# Patient Record
Sex: MALE | Race: WHITE | NOT HISPANIC OR LATINO | Employment: OTHER | URBAN - NONMETROPOLITAN AREA
[De-identification: names, ages, dates, MRNs, and addresses within clinical notes are randomized per-mention and may not be internally consistent; named-entity substitution may affect disease eponyms.]

---

## 2020-09-15 NOTE — PROGRESS NOTES
Patient: Garett Alvarado    YOB: 1950    Date: 09/17/2020    Primary Care Provider: Jose Raul Field MD    Chief Complaint   Patient presents with   • Colonoscopy     positive cologuard       SUBJECTIVE:    History of present illness:  I saw the patient in the office today as a consultation for evaluation and treatment of a positive Cologard test. Patient also complains of diarrhea and blood present when wiping.  No significant rectal pain.  No anemia or weight loss.  No significant abdominal pain.  Last colonoscopy 6 years ago.    The following portions of the patient's history were reviewed and updated as appropriate: allergies, current medications, past family history, past medical history, past social history, past surgical history and problem list.    Review of Systems   Constitutional: Negative for chills, fever and unexpected weight change.   HENT: Negative for trouble swallowing and voice change.    Eyes: Negative for visual disturbance.   Respiratory: Negative for apnea, cough, chest tightness, shortness of breath and wheezing.    Cardiovascular: Negative for chest pain, palpitations and leg swelling.   Gastrointestinal: Positive for blood in stool and diarrhea. Negative for abdominal distention, abdominal pain, anal bleeding, constipation, nausea, rectal pain and vomiting.   Endocrine: Negative for cold intolerance and heat intolerance.   Genitourinary: Negative for difficulty urinating, dysuria, flank pain, scrotal swelling and testicular pain.   Musculoskeletal: Negative for back pain, gait problem and joint swelling.   Skin: Negative for color change, rash and wound.   Neurological: Negative for dizziness, syncope, speech difficulty, weakness, numbness and headaches.   Hematological: Negative for adenopathy. Does not bruise/bleed easily.   Psychiatric/Behavioral: Negative for confusion. The patient is not nervous/anxious.        History:  Past Medical History:   Diagnosis Date   • Rectal  "bleeding        Past Surgical History:   Procedure Laterality Date   • EYE SURGERY     • GLAUCOMA SURGERY         No family history on file.    Social History     Tobacco Use   • Smoking status: Current Every Day Smoker     Packs/day: 2.00     Types: Cigars   • Smokeless tobacco: Never Used   Substance Use Topics   • Alcohol use: Not Currently   • Drug use: Never       Medications:   Current Outpatient Medications:   •  bisacodyl (bisacodyl) 5 MG EC tablet, Take 2 at 3pm and 2 at 7pm the day prior to colonoscopy., Disp: 4 tablet, Rfl: 0  •  dorzolamide-timolol (COSOPT) 22.3-6.8 MG/ML ophthalmic solution, , Disp: , Rfl:   •  polyethylene glycol (MIRALAX) 17 GM/SCOOP powder, Mix 238g powder with 64 oz of clear liquid. Starting at 5pm drink 80z every 10-15 minutes until consumed., Disp: 238 g, Rfl: 0       Allergies: No Known Allergies    OBJECTIVE:    Vital Signs:  Vitals:    09/17/20 0921   BP: 146/93   Pulse: 70   Temp: 98 °F (36.7 °C)   SpO2: 99%   Weight: 109 kg (240 lb)   Height: 175.3 cm (69\")       Physical Exam:   General Appearance:    Alert, cooperative, in no acute distress   Head:    Normocephalic, without obvious abnormality, atraumatic   Eyes:            Lids and lashes normal, conjunctivae and sclerae normal, no   icterus, no pallor, corneas clear, PERRL   Ears:    Ears appear intact with no abnormalities noted   Throat:   No oral lesions, no thrush, oral mucosa moist   Neck:   No adenopathy, supple, trachea midline, no thyromegaly,  no JVD   Lungs:     Clear to auscultation,respirations regular, even and                  unlabored    Heart:    Regular rhythm and normal rate, normal S1 and S2, no            murmur   Abdomen:     no masses, no organomegaly, soft non-tender, non-distended, no guarding.  Minimal epigastric pain left lower quadrant pain.  Some tenderness with no rebound or guarding.  No abdominal wall hernias.   Extremities:   Moves all extremities well, no edema, no cyanosis, no            "  redness   Pulses:   Pulses palpable and equal bilaterally   Skin:   No bleeding, bruising or rash   Lymph nodes:   No palpable adenopathy   Neurologic:   Cranial nerves 2 - 12 grossly intact, sensation intact  Psychiatric: No evidence of depression or anxiety   Results Review:   I reviewed the patient's new clinical results.    Review of Systems was reviewed and confirmed as accurate as documented by the MA.    ASSESSMENT/PLAN:    1. Preop testing    2. Heme positive stool        I recommend a colonoscopy for further evaluation. The procedure was explained as well as the risks which include but are not limited to bleeding, infection, perforation, abdominal pain etc. The patient understands these risks and the procedure and wishes to proceed.  Continue high-fiber diet and Metamucil daily.     Electronically signed by Belinda Hoover MD  09/17/20  14:11 EDT

## 2020-09-17 ENCOUNTER — OFFICE VISIT (OUTPATIENT)
Dept: SURGERY | Facility: CLINIC | Age: 70
End: 2020-09-17

## 2020-09-17 VITALS
HEART RATE: 70 BPM | OXYGEN SATURATION: 99 % | TEMPERATURE: 98 F | BODY MASS INDEX: 35.55 KG/M2 | DIASTOLIC BLOOD PRESSURE: 93 MMHG | HEIGHT: 69 IN | SYSTOLIC BLOOD PRESSURE: 146 MMHG | WEIGHT: 240 LBS

## 2020-09-17 DIAGNOSIS — R19.5 HEME POSITIVE STOOL: ICD-10-CM

## 2020-09-17 DIAGNOSIS — Z01.818 PREOP TESTING: Primary | ICD-10-CM

## 2020-09-17 PROCEDURE — 99203 OFFICE O/P NEW LOW 30 MIN: CPT | Performed by: SURGERY

## 2020-09-17 RX ORDER — BISACODYL 5 MG
TABLET, DELAYED RELEASE (ENTERIC COATED) ORAL
Qty: 4 TABLET | Refills: 0 | Status: SHIPPED | OUTPATIENT
Start: 2020-09-17

## 2020-09-17 RX ORDER — DORZOLAMIDE HYDROCHLORIDE AND TIMOLOL MALEATE 20; 5 MG/ML; MG/ML
SOLUTION/ DROPS OPHTHALMIC
COMMUNITY
Start: 2020-08-19

## 2020-09-17 RX ORDER — POLYETHYLENE GLYCOL 3350 17 G/17G
POWDER, FOR SOLUTION ORAL
Qty: 238 G | Refills: 0 | Status: SHIPPED | OUTPATIENT
Start: 2020-09-17

## 2020-09-29 ENCOUNTER — HOSPITAL ENCOUNTER (OUTPATIENT)
Facility: HOSPITAL | Age: 70
Discharge: HOME | End: 2020-09-29
Payer: MEDICARE

## 2020-09-29 LAB — SARS-COV-2, NAAT: NOT DETECTED

## 2020-09-29 PROCEDURE — U0002 COVID-19 LAB TEST NON-CDC: HCPCS

## 2020-10-01 ENCOUNTER — ANESTHESIA EVENT (OUTPATIENT)
Dept: ENDOSCOPY | Facility: HOSPITAL | Age: 70
End: 2020-10-01
Payer: MEDICARE

## 2020-10-01 ENCOUNTER — ANESTHESIA (OUTPATIENT)
Dept: ENDOSCOPY | Facility: HOSPITAL | Age: 70
End: 2020-10-01
Payer: MEDICARE

## 2020-10-01 ENCOUNTER — HOSPITAL ENCOUNTER (OUTPATIENT)
Facility: HOSPITAL | Age: 70
Setting detail: OUTPATIENT SURGERY
Discharge: HOME | End: 2020-10-01
Attending: SURGERY | Admitting: SURGERY
Payer: MEDICARE

## 2020-10-01 ENCOUNTER — OUTSIDE FACILITY SERVICE (OUTPATIENT)
Dept: SURGERY | Facility: CLINIC | Age: 70
End: 2020-10-01

## 2020-10-01 VITALS
RESPIRATION RATE: 18 BRPM | OXYGEN SATURATION: 98 % | SYSTOLIC BLOOD PRESSURE: 150 MMHG | DIASTOLIC BLOOD PRESSURE: 102 MMHG

## 2020-10-01 VITALS
WEIGHT: 235 LBS | HEART RATE: 59 BPM | HEIGHT: 70 IN | BODY MASS INDEX: 33.64 KG/M2 | DIASTOLIC BLOOD PRESSURE: 90 MMHG | SYSTOLIC BLOOD PRESSURE: 156 MMHG | OXYGEN SATURATION: 92 % | TEMPERATURE: 97.8 F | RESPIRATION RATE: 20 BRPM

## 2020-10-01 PROCEDURE — 3700000000 HC ANESTHESIA ATTENDED CARE: Performed by: SURGERY

## 2020-10-01 PROCEDURE — 88342 IMHCHEM/IMCYTCHM 1ST ANTB: CPT

## 2020-10-01 PROCEDURE — 7100000010 HC PHASE II RECOVERY - FIRST 15 MIN: Performed by: SURGERY

## 2020-10-01 PROCEDURE — 7100000011 HC PHASE II RECOVERY - ADDTL 15 MIN: Performed by: SURGERY

## 2020-10-01 PROCEDURE — 2709999900 HC NON-CHARGEABLE SUPPLY: Performed by: SURGERY

## 2020-10-01 PROCEDURE — 2580000003 HC RX 258: Performed by: SURGERY

## 2020-10-01 PROCEDURE — 6360000002 HC RX W HCPCS: Performed by: NURSE ANESTHETIST, CERTIFIED REGISTERED

## 2020-10-01 PROCEDURE — 88341 IMHCHEM/IMCYTCHM EA ADD ANTB: CPT

## 2020-10-01 PROCEDURE — 45385 COLONOSCOPY W/LESION REMOVAL: CPT | Performed by: SURGERY

## 2020-10-01 PROCEDURE — 2500000003 HC RX 250 WO HCPCS: Performed by: NURSE ANESTHETIST, CERTIFIED REGISTERED

## 2020-10-01 PROCEDURE — 45380 COLONOSCOPY AND BIOPSY: CPT | Performed by: SURGERY

## 2020-10-01 PROCEDURE — 3609010200 HC COLONOSCOPY ABLATION TUMOR POLYP/OTHER LES: Performed by: SURGERY

## 2020-10-01 PROCEDURE — 88305 TISSUE EXAM BY PATHOLOGIST: CPT

## 2020-10-01 PROCEDURE — 3700000001 HC ADD 15 MINUTES (ANESTHESIA): Performed by: SURGERY

## 2020-10-01 RX ORDER — SODIUM CHLORIDE, SODIUM LACTATE, POTASSIUM CHLORIDE, CALCIUM CHLORIDE 600; 310; 30; 20 MG/100ML; MG/100ML; MG/100ML; MG/100ML
INJECTION, SOLUTION INTRAVENOUS CONTINUOUS
Status: DISCONTINUED | OUTPATIENT
Start: 2020-10-01 | End: 2020-10-01 | Stop reason: HOSPADM

## 2020-10-01 RX ORDER — PROPOFOL 10 MG/ML
INJECTION, EMULSION INTRAVENOUS PRN
Status: DISCONTINUED | OUTPATIENT
Start: 2020-10-01 | End: 2020-10-01 | Stop reason: SDUPTHER

## 2020-10-01 RX ADMIN — PROPOFOL 40 MG: 10 INJECTION, EMULSION INTRAVENOUS at 10:56

## 2020-10-01 RX ADMIN — PROPOFOL 30 MG: 10 INJECTION, EMULSION INTRAVENOUS at 11:11

## 2020-10-01 RX ADMIN — PROPOFOL 50 MG: 10 INJECTION, EMULSION INTRAVENOUS at 10:48

## 2020-10-01 RX ADMIN — PROPOFOL 30 MG: 10 INJECTION, EMULSION INTRAVENOUS at 11:00

## 2020-10-01 RX ADMIN — PROPOFOL 50 MG: 10 INJECTION, EMULSION INTRAVENOUS at 10:41

## 2020-10-01 RX ADMIN — LIDOCAINE HYDROCHLORIDE 20 MG: 10 INJECTION, SOLUTION INFILTRATION; PERINEURAL at 10:37

## 2020-10-01 RX ADMIN — PROPOFOL 40 MG: 10 INJECTION, EMULSION INTRAVENOUS at 10:51

## 2020-10-01 RX ADMIN — PROPOFOL 20 MG: 10 INJECTION, EMULSION INTRAVENOUS at 11:07

## 2020-10-01 RX ADMIN — SODIUM CHLORIDE, POTASSIUM CHLORIDE, SODIUM LACTATE AND CALCIUM CHLORIDE: 600; 310; 30; 20 INJECTION, SOLUTION INTRAVENOUS at 09:35

## 2020-10-01 RX ADMIN — PROPOFOL 40 MG: 10 INJECTION, EMULSION INTRAVENOUS at 10:44

## 2020-10-01 RX ADMIN — PROPOFOL 40 MG: 10 INJECTION, EMULSION INTRAVENOUS at 11:04

## 2020-10-01 RX ADMIN — PROPOFOL 60 MG: 10 INJECTION, EMULSION INTRAVENOUS at 10:37

## 2020-10-01 ASSESSMENT — LIFESTYLE VARIABLES: SMOKING_STATUS: 1

## 2020-10-01 NOTE — PROGRESS NOTES
Pt arrives ambulatory. No complaints noted. Verifies he is here for colonoscopy with Dr. Nandini Wilson. States prep was effective. Verbalizes understanding of procedure. Consent on chart. States daughter will drive him home post-procedure. HR regular. Lungs clear. +BS. Denies chest pain or SOA. Side rails up x 2.

## 2020-10-01 NOTE — ANESTHESIA PRE PROCEDURE
Department of Anesthesiology  Preprocedure Note       Name:  Edgar Elizabeth   Age:  68 y.o.  :  1950                                          MRN:  [id number]         Date:  10/1/2020      Surgeon: Codi Vasquez):  Carey Warren MD    Procedure: Procedure(s):  COLONOSCOPY DIAGNOSTIC    Medications prior to admission:   Prior to Admission medications    Not on File       Current medications:    Current Facility-Administered Medications   Medication Dose Route Frequency Provider Last Rate Last Dose    lactated ringers infusion   Intravenous Continuous Carey Warren MD 80 mL/hr at 10/01/20 0935         Allergies: Allergies   Allergen Reactions    Iv Dye [Iodides]        Problem List:  There is no problem list on file for this patient. Past Medical History:  No past medical history on file. Past Surgical History:  No past surgical history on file. Social History:    Social History     Tobacco Use    Smoking status: Not on file   Substance Use Topics    Alcohol use: Not on file                                Counseling given: Not Answered      Vital Signs (Current):   Vitals:    10/01/20 0927   BP: (!) 156/103   Pulse: 79   Resp: 20   Temp: 97.3 °F (36.3 °C)   TempSrc: Oral   SpO2: 95%   Weight: 235 lb (106.6 kg)   Height: 5' 9.5\" (1.765 m)                                              BP Readings from Last 3 Encounters:   10/01/20 (!) 156/103       NPO Status: Time of last liquid consumption: 1800                        Time of last solid consumption: 1400                        Date of last liquid consumption: 20                        Date of last solid food consumption: 20    BMI:   Wt Readings from Last 3 Encounters:   10/01/20 235 lb (106.6 kg)     Body mass index is 34.21 kg/m².     CBC: No results found for: WBC, RBC, HGB, HCT, MCV, RDW, PLT    CMP: No results found for: NA, K, CL, CO2, BUN, CREATININE, GFRAA, AGRATIO, LABGLOM, GLUCOSE, PROT, CALCIUM, BILITOT, ALKPHOS, AST, ALT    POC Tests: No results for input(s): POCGLU, POCNA, POCK, POCCL, POCBUN, POCHEMO, POCHCT in the last 72 hours. Coags: No results found for: PROTIME, INR, APTT    HCG (If Applicable): No results found for: PREGTESTUR, PREGSERUM, HCG, HCGQUANT     ABGs: No results found for: PHART, PO2ART, SZS4END, LNQ0BZS, BEART, D9JGREVN     Type & Screen (If Applicable):  No results found for: LABABO, LABRH    Drug/Infectious Status (If Applicable):  No results found for: HIV, HEPCAB    COVID-19 Screening (If Applicable):   Lab Results   Component Value Date    COVID19 Not Detected 09/29/2020         Anesthesia Evaluation  Patient summary reviewed and Nursing notes reviewed  Airway: Mallampati: II        Dental:          Pulmonary:normal exam  breath sounds clear to auscultation  (+) current smoker                           Cardiovascular:Negative CV ROS            Rhythm: regular  Rate: normal                    Neuro/Psych:   Negative Neuro/Psych ROS              GI/Hepatic/Renal: Neg GI/Hepatic/Renal ROS            Endo/Other:              Pt had PAT visit. ROS comment: BMI 34 Abdominal:           Vascular: negative vascular ROS. Anesthesia Plan      MAC     ASA 2       Induction: intravenous. Anesthetic plan and risks discussed with patient. Use of blood products discussed with patient whom.                    SINAN Diaz - CRNA   10/1/2020

## 2020-10-01 NOTE — PROGRESS NOTES
Pt pauline PO well without N/V.  IV DC'd with tip intact and pressure held. DC instructions with F/U given without questions, also reviewed with pt daughter. Condition stable. Belongings with pt. Pt taken to POV via [facility], daughter to drive pt home.

## 2020-10-01 NOTE — ANESTHESIA POSTPROCEDURE EVALUATION
Department of Anesthesiology  Postprocedure Note    Patient: Pascual Perez  MRN: [id number]  YOB: 1950  Date of evaluation: 10/1/2020  Time:  12:04 PM     Procedure Summary     Date:  10/01/20 Room / Location:  [address] [place] [address] / [facility]    Anesthesia Start:  4613 Anesthesia Stop:  6099    Procedure:  COLONOSCOPY DIAGNOSTIC (N/A ) Diagnosis:       (T82.102)      (R19.5)    Surgeon:  Kishore Morales MD Responsible Provider:  SINAN Coughlin CRNA    Anesthesia Type:  MAC ASA Status:  2          Anesthesia Type: MAC    Kaylah Phase I: Kaylah Score: 10    Kaylah Phase II:      Last vitals: Reviewed and per EMR flowsheets.        Anesthesia Post Evaluation    Patient location during evaluation: bedside  Patient participation: complete - patient participated  Level of consciousness: awake and alert  Airway patency: patent  Nausea & Vomiting: no nausea and no vomiting  Complications: no  Cardiovascular status: blood pressure returned to baseline  Respiratory status: acceptable  Hydration status: stable

## 2020-10-06 NOTE — PROGRESS NOTES
Patient: Garett Alvarado    YOB: 1950    Date: 10/08/2020    Primary Care Provider: Jose Raul Field MD    Reason for Consultation: Follow-up colonoscopy    Chief Complaint   Patient presents with   • Follow-up     colon       History of present illness:  I saw the patient in the office today as a followup from their recent colonoscopy with polypectomy, the pathology report did show invasive moderately differentiated adenocarcinoma arising in a tubular adenoma.  They state that they have done well and are having no complaints.  Patient's margins were clear by 1.5 mm on initial polypectomy site and further resection was also negative for progressive cancer.  Patient high risk for surgical intervention.    The following portions of the patient's history were reviewed and updated as appropriate: allergies, current medications, past family history, past medical history, past social history, past surgical history and problem list.    Review of Systems   Constitutional: Negative for chills, fever and unexpected weight change.   HENT: Negative for trouble swallowing and voice change.    Eyes: Negative for visual disturbance.   Respiratory: Negative for apnea, cough, chest tightness, shortness of breath and wheezing.    Cardiovascular: Negative for chest pain, palpitations and leg swelling.   Gastrointestinal: Negative for abdominal distention, abdominal pain, anal bleeding, blood in stool, constipation, diarrhea, nausea, rectal pain and vomiting.   Endocrine: Negative for cold intolerance and heat intolerance.   Genitourinary: Negative for difficulty urinating, dysuria, flank pain, scrotal swelling and testicular pain.   Musculoskeletal: Negative for back pain, gait problem and joint swelling.   Skin: Negative for color change, rash and wound.   Neurological: Negative for dizziness, syncope, speech difficulty, weakness, numbness and headaches.   Hematological: Negative for adenopathy. Does not bruise/bleed  "easily.   Psychiatric/Behavioral: Negative for confusion. The patient is not nervous/anxious.        Allergies:  No Known Allergies    Medications:    Current Outpatient Medications:   •  bisacodyl (bisacodyl) 5 MG EC tablet, Take 2 at 3pm and 2 at 7pm the day prior to colonoscopy., Disp: 4 tablet, Rfl: 0  •  dorzolamide-timolol (COSOPT) 22.3-6.8 MG/ML ophthalmic solution, , Disp: , Rfl:   •  polyethylene glycol (MIRALAX) 17 GM/SCOOP powder, Mix 238g powder with 64 oz of clear liquid. Starting at 5pm drink 80z every 10-15 minutes until consumed., Disp: 238 g, Rfl: 0    Vital Signs:  Vitals:    10/08/20 1015   BP: 144/74   Pulse: 81   Temp: 98.2 °F (36.8 °C)   TempSrc: Temporal   SpO2: 98%   Weight: 109 kg (240 lb 4.8 oz)   Height: 175.3 cm (69.02\")       Physical Exam:   General Appearance:    Alert, cooperative, in no acute distress   Abdomen:     no masses, no organomegaly, soft and nontender.  No abdominal wall masses.   Chest:      Clear to ausculation            Cor:     Regular rate and rhythm    Results Review:   I reviewed the patient's new clinical results.    Assessment / Plan:    1. Rectal cancer (CMS/HCC)    2. Adenomatous polyp of ascending colon        I did discuss the situation with the patient today in the office and they have done well from their recent colonoscopy with polypectomy.  I have released the patient back to normal activity.  I need to see the patient back in the office in 6 months and they will need to have repeat colonoscopy at that time.  Patient told that he has high risk for recurrence of the cancer.  It is resected with negative margins at this time and he would like to opt for repeat colonoscopy in 6 months instead resection.    Electronically signed by Belinda Hoover MD  10/08/20                  "

## 2020-10-08 ENCOUNTER — OFFICE VISIT (OUTPATIENT)
Dept: SURGERY | Facility: CLINIC | Age: 70
End: 2020-10-08

## 2020-10-08 VITALS
TEMPERATURE: 98.2 F | HEART RATE: 81 BPM | HEIGHT: 69 IN | DIASTOLIC BLOOD PRESSURE: 74 MMHG | BODY MASS INDEX: 35.59 KG/M2 | OXYGEN SATURATION: 98 % | SYSTOLIC BLOOD PRESSURE: 144 MMHG | WEIGHT: 240.3 LBS

## 2020-10-08 DIAGNOSIS — D12.2 ADENOMATOUS POLYP OF ASCENDING COLON: ICD-10-CM

## 2020-10-08 DIAGNOSIS — C20 RECTAL CANCER (HCC): Primary | ICD-10-CM

## 2020-10-08 PROCEDURE — 99212 OFFICE O/P EST SF 10 MIN: CPT | Performed by: SURGERY

## 2021-04-05 NOTE — PROGRESS NOTES
Patient: Garett Alvarado  YOB: 1950    Date: 04/08/2021    Primary Care Provider: Jose Raul Field MD    Chief Complaint   Patient presents with   • Follow-up     colon cancer       History: I saw patient is in the office today for 6 month follow up colonoscopy with polypectomy, the pathology report did show invasive moderately differentiated adenocarcinoma arising in a tubular adenoma.  They state that they have done well and are having no complaints.    Patient denies rectal bleeding, abdominal pain and constipation improved.    The following portions of the patient's history were reviewed and updated as appropriate: allergies, current medications, past family history, past medical history, past social history, past surgical history and problem list.    Review of Systems   Constitutional: Negative for chills, fever and unexpected weight change.   HENT: Negative for trouble swallowing and voice change.    Eyes: Negative for visual disturbance.   Respiratory: Negative for apnea, cough, chest tightness, shortness of breath and wheezing.    Cardiovascular: Negative for chest pain, palpitations and leg swelling.   Gastrointestinal: Negative for abdominal distention, abdominal pain, anal bleeding, blood in stool, constipation, diarrhea, nausea, rectal pain and vomiting.   Endocrine: Negative for cold intolerance and heat intolerance.   Genitourinary: Negative for difficulty urinating, dysuria, flank pain, scrotal swelling and testicular pain.   Musculoskeletal: Negative for back pain, gait problem and joint swelling.   Skin: Negative for color change, rash and wound.   Neurological: Negative for dizziness, syncope, speech difficulty, weakness, numbness and headaches.   Hematological: Negative for adenopathy. Does not bruise/bleed easily.   Psychiatric/Behavioral: Negative for confusion. The patient is not nervous/anxious.        Vital Signs  Vitals:    04/08/21 0936   BP: 153/82   Pulse: 111   Temp: 98.9  "°F (37.2 °C)   SpO2: 95%   Weight: 111 kg (244 lb 3.2 oz)   Height: 175.3 cm (69.02\")       Allergies:  No Known Allergies    Medications:    Current Outpatient Medications:   •  bisacodyl (bisacodyl) 5 MG EC tablet, Take 2 at 3pm and 2 at 7pm the day prior to colonoscopy., Disp: 4 tablet, Rfl: 0  •  brimonidine (ALPHAGAN) 0.2 % ophthalmic solution, , Disp: , Rfl:   •  dorzolamide-timolol (COSOPT) 22.3-6.8 MG/ML ophthalmic solution, , Disp: , Rfl:   •  polyethylene glycol (MIRALAX) 17 GM/SCOOP powder, Mix 238g powder with 64 oz of clear liquid. Starting at 5pm drink 80z every 10-15 minutes until consumed., Disp: 238 g, Rfl: 0    Physical Exam:   General Appearance:    Alert, cooperative, in no acute distress   Head:    Normocephalic, without obvious abnormality, atraumatic   Lungs:     Clear to auscultation,respirations regular, even and                  unlabored    Heart:    Regular rhythm and normal rate, normal S1 and S2, no            murmur, no gallop, no rub, no click   Abdomen:     Normal bowel sounds, no masses, no organomegaly, soft        non-tender, non-distended, no guarding, no rebound                Tenderness.  No abdominal wall masses or hernias.  No significant tenderness.   Extremities:   Moves all extremities well, no edema, no cyanosis, no             redness   Pulses:   Pulses palpable and equal bilaterally   Skin:   No bleeding, bruising or rash     Results Review:   I reviewed the patient's new clinical results.     Review of Systems was reviewed and confirmed as accurate as documented by the MA.    ASSESSMENT/PLAN:    1. History of colon polyps    2. Chronic idiopathic constipation       Patient scheduled for colonoscopy to evaluate the adenocarcinoma that was found in a polyp.  Risk of bleeding perforation discussed and patient agreeable.  Continue high-fiber diet and Metamucil for chronic constipation.    Electronically signed by Belinda Hoover MD  04/08/21      Portions of this note " have been scribed for Belinda Hoover MD by Michelle Milligan. 4/8/2021  10:41 EDT

## 2021-04-08 ENCOUNTER — OFFICE VISIT (OUTPATIENT)
Dept: SURGERY | Facility: CLINIC | Age: 71
End: 2021-04-08

## 2021-04-08 ENCOUNTER — TELEPHONE (OUTPATIENT)
Dept: SURGERY | Facility: CLINIC | Age: 71
End: 2021-04-08

## 2021-04-08 VITALS
SYSTOLIC BLOOD PRESSURE: 153 MMHG | WEIGHT: 244.2 LBS | HEART RATE: 111 BPM | BODY MASS INDEX: 36.17 KG/M2 | DIASTOLIC BLOOD PRESSURE: 82 MMHG | HEIGHT: 69 IN | TEMPERATURE: 98.9 F | OXYGEN SATURATION: 95 %

## 2021-04-08 DIAGNOSIS — K59.04 CHRONIC IDIOPATHIC CONSTIPATION: ICD-10-CM

## 2021-04-08 DIAGNOSIS — Z01.818 PREOP TESTING: Primary | ICD-10-CM

## 2021-04-08 DIAGNOSIS — Z86.010 HISTORY OF COLON POLYPS: Primary | ICD-10-CM

## 2021-04-08 PROCEDURE — 99213 OFFICE O/P EST LOW 20 MIN: CPT | Performed by: SURGERY

## 2021-04-08 RX ORDER — POLYETHYLENE GLYCOL 3350 17 G/17G
POWDER, FOR SOLUTION ORAL
Qty: 238 G | Refills: 0 | Status: SHIPPED | OUTPATIENT
Start: 2021-04-08

## 2021-04-08 RX ORDER — BRIMONIDINE TARTRATE 2 MG/ML
SOLUTION/ DROPS OPHTHALMIC
COMMUNITY
Start: 2021-01-28

## 2021-04-08 RX ORDER — BISACODYL 5 MG
TABLET, DELAYED RELEASE (ENTERIC COATED) ORAL
Qty: 4 TABLET | Refills: 0 | Status: SHIPPED | OUTPATIENT
Start: 2021-04-08

## 2021-04-19 ENCOUNTER — HOSPITAL ENCOUNTER (OUTPATIENT)
Facility: HOSPITAL | Age: 71
Discharge: HOME | End: 2021-04-19
Payer: MEDICARE

## 2021-04-19 LAB — SARS-COV-2, NAAT: NOT DETECTED

## 2021-04-19 PROCEDURE — 87635 SARS-COV-2 COVID-19 AMP PRB: CPT

## 2021-04-22 ENCOUNTER — ANESTHESIA EVENT (OUTPATIENT)
Dept: ENDOSCOPY | Facility: HOSPITAL | Age: 71
End: 2021-04-22
Payer: MEDICARE

## 2021-04-22 ENCOUNTER — ANESTHESIA (OUTPATIENT)
Dept: ENDOSCOPY | Facility: HOSPITAL | Age: 71
End: 2021-04-22
Payer: MEDICARE

## 2021-04-22 ENCOUNTER — HOSPITAL ENCOUNTER (OUTPATIENT)
Facility: HOSPITAL | Age: 71
Setting detail: OUTPATIENT SURGERY
Discharge: HOME | End: 2021-04-22
Attending: SURGERY | Admitting: SURGERY
Payer: MEDICARE

## 2021-04-22 ENCOUNTER — OUTSIDE FACILITY SERVICE (OUTPATIENT)
Dept: SURGERY | Facility: CLINIC | Age: 71
End: 2021-04-22

## 2021-04-22 VITALS
HEART RATE: 65 BPM | BODY MASS INDEX: 33.64 KG/M2 | WEIGHT: 235 LBS | RESPIRATION RATE: 18 BRPM | OXYGEN SATURATION: 96 % | SYSTOLIC BLOOD PRESSURE: 148 MMHG | DIASTOLIC BLOOD PRESSURE: 94 MMHG | TEMPERATURE: 98.2 F | HEIGHT: 70 IN

## 2021-04-22 VITALS
DIASTOLIC BLOOD PRESSURE: 107 MMHG | RESPIRATION RATE: 14 BRPM | SYSTOLIC BLOOD PRESSURE: 156 MMHG | OXYGEN SATURATION: 96 %

## 2021-04-22 PROCEDURE — 2580000003 HC RX 258: Performed by: SURGERY

## 2021-04-22 PROCEDURE — 7100000010 HC PHASE II RECOVERY - FIRST 15 MIN: Performed by: SURGERY

## 2021-04-22 PROCEDURE — 88305 TISSUE EXAM BY PATHOLOGIST: CPT

## 2021-04-22 PROCEDURE — 3609010400 HC COLONOSCOPY POLYPECTOMY HOT BIOPSY: Performed by: SURGERY

## 2021-04-22 PROCEDURE — 2500000003 HC RX 250 WO HCPCS: Performed by: NURSE ANESTHETIST, CERTIFIED REGISTERED

## 2021-04-22 PROCEDURE — 2709999900 HC NON-CHARGEABLE SUPPLY: Performed by: SURGERY

## 2021-04-22 PROCEDURE — 3700000001 HC ADD 15 MINUTES (ANESTHESIA): Performed by: SURGERY

## 2021-04-22 PROCEDURE — 45380 COLONOSCOPY AND BIOPSY: CPT | Performed by: SURGERY

## 2021-04-22 PROCEDURE — 7100000011 HC PHASE II RECOVERY - ADDTL 15 MIN: Performed by: SURGERY

## 2021-04-22 PROCEDURE — 6360000002 HC RX W HCPCS: Performed by: NURSE ANESTHETIST, CERTIFIED REGISTERED

## 2021-04-22 PROCEDURE — 45384 COLONOSCOPY W/LESION REMOVAL: CPT | Performed by: SURGERY

## 2021-04-22 PROCEDURE — 3700000000 HC ANESTHESIA ATTENDED CARE: Performed by: SURGERY

## 2021-04-22 PROCEDURE — 3609010300 HC COLONOSCOPY W/BIOPSY SINGLE/MULTIPLE

## 2021-04-22 RX ORDER — SODIUM CHLORIDE, SODIUM LACTATE, POTASSIUM CHLORIDE, CALCIUM CHLORIDE 600; 310; 30; 20 MG/100ML; MG/100ML; MG/100ML; MG/100ML
INJECTION, SOLUTION INTRAVENOUS CONTINUOUS
Status: DISCONTINUED | OUTPATIENT
Start: 2021-04-22 | End: 2021-04-22 | Stop reason: HOSPADM

## 2021-04-22 RX ORDER — PROPOFOL 10 MG/ML
INJECTION, EMULSION INTRAVENOUS PRN
Status: DISCONTINUED | OUTPATIENT
Start: 2021-04-22 | End: 2021-04-22 | Stop reason: SDUPTHER

## 2021-04-22 RX ADMIN — LIDOCAINE HYDROCHLORIDE 20 MG: 10 INJECTION, SOLUTION INFILTRATION; PERINEURAL at 09:35

## 2021-04-22 RX ADMIN — PROPOFOL 60 MG: 10 INJECTION, EMULSION INTRAVENOUS at 09:35

## 2021-04-22 RX ADMIN — PROPOFOL 40 MG: 10 INJECTION, EMULSION INTRAVENOUS at 09:43

## 2021-04-22 RX ADMIN — PROPOFOL 50 MG: 10 INJECTION, EMULSION INTRAVENOUS at 09:39

## 2021-04-22 RX ADMIN — SODIUM CHLORIDE, POTASSIUM CHLORIDE, SODIUM LACTATE AND CALCIUM CHLORIDE: 600; 310; 30; 20 INJECTION, SOLUTION INTRAVENOUS at 08:37

## 2021-04-22 NOTE — PROGRESS NOTES
Pt arrives ambulatory. No complaints noted. Verifies he is here for colonoscopy with Dr. Courtney Garcia. States prep was effective. Verbalizes understanding of procedure. Pt cannot see to sign consent. Gives verbal consent for a colonoscopy with Dr. Courtney Garcia. Pt gives permission for daughter to sign consent form. HR regular. Lungs clear. +BS. Denies chest pain or SOA. Per pt, his ability to see depends on amount of light outside/type of day. States daughter will drive him home post-procedure. Side rails up x 2.

## 2021-04-22 NOTE — H&P
HISTORY & PHYSICAL      Patient Name: Rosalinda Pickering [facility] [address] Record Number: [id number]       Date of Service: 4/22/2021      I have reviewed the consult or history and physical from 4/15/2021. There have been no significant changes in the patient's history or exam.  There have been no changes in the patient's medications. Past Surgical History:   Procedure Laterality Date    COLONOSCOPY N/A 10/1/2020    COLONOSCOPY W/ ENDOSCOPIC MUCOSAL RESECTION performed by Olivia Reece MD at [facility]        No past medical history on file.      Current Facility-Administered Medications   Medication Dose Route Frequency Provider Last Rate Last Admin    lactated ringers infusion   Intravenous Continuous Olivia Reece MD 80 mL/hr at 04/22/21 0837 New Bag at 04/22/21 0229        Electronically signed by Olivia Reece MD on 4/22/2021 at 9:58 AM

## 2021-04-22 NOTE — ANESTHESIA POSTPROCEDURE EVALUATION
Department of Anesthesiology  Postprocedure Note    Patient: Сергей Rater  MRN: [id number]  YOB: 1950  Date of evaluation: 4/22/2021  Time:  10:41 AM     Procedure Summary     Date: 04/22/21 Room / Location: [address] [place] [address] 01 / [address] [facility]    Anesthesia Start: 1430 Anesthesia Stop: 1849    Procedure: COLONOSCOPY DIAGNOSTIC (N/A ) Diagnosis:       (Z86.010)      (K59.04)    Surgeons: Lula Ellison MD Responsible Provider: SINAN Camarillo CRNA    Anesthesia Type: MAC ASA Status: 3          Anesthesia Type: MAC    Kaylah Phase I: Kaylah Score: 10    Kaylah Phase II: Kaylah Score: 10    Last vitals: Reviewed and per EMR flowsheets.        Anesthesia Post Evaluation    Patient location during evaluation: bedside  Patient participation: complete - patient participated  Level of consciousness: awake and alert  Airway patency: patent  Nausea & Vomiting: no nausea and no vomiting  Complications: no  Cardiovascular status: blood pressure returned to baseline  Respiratory status: acceptable  Hydration status: stable

## 2021-04-22 NOTE — PROGRESS NOTES
Report received from [facility], [facility]. Pt to room via stretcher, NAD, No c/o noted. HOB elevated 30 degrees, Left side lying position. BS active in all 4 quads. Lung sounds CTA and no distress noted. Rails up x2, stretcher locked. Assessment completed. Awakens easily. Abd soft /NT/ND:+flatus, +BS, Denies N/V/abd pain.

## 2021-04-22 NOTE — PROGRESS NOTES
Pt tolerating PO well without N/V. IV DC'ed with tip intact and pressure applied. No complications at site. DC instructions with follow up given without questions. Condition stable. Belongings with pt. Pt left endoscopy with dgt. Pt ambulatory but unsteady, so transported to car via [facility]. DC instructions in hand.

## 2021-04-22 NOTE — OP NOTE
PATIENT:    Rosalinda Staton    DATE OF SURGERY:  04/22/21    PHYSICIAN:    Nitin Long MD, FACS    REFERRING PHYSICIAN:  Lulú Scott MD      YOB: 1950    PREOPERATIVE DIAGNOSIS:   History of rectal carcinoma with anal polyp    POSTOPERATIVE DIAGNOSIS: Well-healed scar, polyps x2      Estimated blood loss: None    PROCEDURE:  Colonoscopy with polypectomy x2 via hot forcep and multiple biopsies of rectal scar via cold forcep    HISTORY:   The patient was sent to me as a consultation via Lulú Scott MD for evaluation and treatment of the above-mentioned symptomatology. The patient is here now today for elective colonoscopy. I did meet with the patient preoperatively who understands the full risks and benefits of the above-mentioned procedure. We will proceed with this today on an elective basis. ANESTHESIA:  The patient was monitored both preoperatively and postoperatively in the normal fashion from a cardiovascular standpoint. Oxygen was delivered at 2 liters per nasal cannula, and oxygen saturations were monitored during the procedure. The patient remained stable from a cardiovascular standpoint throughout the entire procedure. OPERATIVE PROCEDURE:  The patient was taken to the endoscopy unit and placed in the supine position and given anesthesia as mentioned above. The patient was then placed in the left lateral decubitus position and the scope was introduced into the patients anus and then into the rectum without difficulty. The scope was carefully advanced throughout the colon to the ileocecal valve. All mucosal surfaces were visualized. Upon careful withdrawal of the scope, there was noted to be well-healed scar in the rectum where the tattoo area was present. No recurrent mass or nodule present. I obtained 6 biopsies to rule out microscopic disease. I cannot palpate on rectal examination as well.   Patient also had 2 polyps in the transverse colon and sigmoid colon measured 5 mm each, both removed with hot forcep and retrieved. No other findings in the colon. .      A retroflexed view was obtained of the patients rectum and this showed no hemorrhoids. Digital rectal examination was performed and revealed good sphincter tone and no obvious masses. The patient was stable at this point in time and subsequently transferred back to the recovery room in stable condition. QUALITY OF PREP: Adequate    PLAN: Repeat colonoscopy in 1 year to continue follow-up on rectal carcinoma.     Electronically signed by Andrew Stout MD, FACS on 4/22/2021 at 9:59 AM

## 2021-04-22 NOTE — ANESTHESIA PRE PROCEDURE
Department of Anesthesiology  Preprocedure Note       Name:  Rebeca Baumgarten   Age:  65 y.o.  :  1950                                          MRN:  [id number]         Date:  2021      Surgeon: Julius Baez):  Aiden Ram MD    Procedure: Procedure(s):  COLONOSCOPY DIAGNOSTIC    Medications prior to admission:   Prior to Admission medications    Not on File       Current medications:    Current Facility-Administered Medications   Medication Dose Route Frequency Provider Last Rate Last Admin    lactated ringers infusion   Intravenous Continuous Aiden Ram MD 80 mL/hr at 21 0837 New Bag at 21 0837       Allergies: Allergies   Allergen Reactions    Iv Dye [Iodides]        Problem List:  There is no problem list on file for this patient. Past Medical History:  No past medical history on file. Past Surgical History:        Procedure Laterality Date    COLONOSCOPY N/A 10/1/2020    COLONOSCOPY W/ ENDOSCOPIC MUCOSAL RESECTION performed by Aiden Ram MD at [facility]       Social History:    Social History     Tobacco Use    Smoking status: Not on file   Substance Use Topics    Alcohol use: Not on file                                Counseling given: Not Answered      Vital Signs (Current):   Vitals:    21 0829   BP: (!) 163/112   Pulse: 82   Resp: 24   SpO2: 98%   Weight: 235 lb (106.6 kg)   Height: 5' 9.5\" (1.765 m)                                              BP Readings from Last 3 Encounters:   21 (!) 163/112   10/01/20 (!) 156/90   10/01/20 (!) 150/102       NPO Status: Time of last liquid consumption: 2345                        Time of last solid consumption: 0900                        Date of last liquid consumption: 21                        Date of last solid food consumption: 21    BMI:   Wt Readings from Last 3 Encounters:   21 235 lb (106.6 kg)   10/01/20 235 lb (106.6 kg)     Body mass index is 34.21 kg/m².     CBC: No results found for: WBC, RBC, HGB, HCT, MCV, RDW, PLT    CMP: No results found for: NA, K, CL, CO2, BUN, CREATININE, GFRAA, AGRATIO, LABGLOM, GLUCOSE, PROT, CALCIUM, BILITOT, ALKPHOS, AST, ALT    POC Tests: No results for input(s): POCGLU, POCNA, POCK, POCCL, POCBUN, POCHEMO, POCHCT in the last 72 hours. Coags: No results found for: PROTIME, INR, APTT    HCG (If Applicable): No results found for: PREGTESTUR, PREGSERUM, HCG, HCGQUANT     ABGs: No results found for: PHART, PO2ART, JBJ4TNK, DCL4FAR, BEART, Z4HLITLJ     Type & Screen (If Applicable):  No results found for: LABABO, LABRH    Drug/Infectious Status (If Applicable):  No results found for: HIV, HEPCAB    COVID-19 Screening (If Applicable):   Lab Results   Component Value Date    COVID19 Not Detected 04/19/2021           Anesthesia Evaluation  Patient summary reviewed and Nursing notes reviewed  Airway: Mallampati: II        Dental:          Pulmonary:Negative Pulmonary ROS and normal exam  breath sounds clear to auscultation                             Cardiovascular:Negative CV ROS            Rhythm: regular  Rate: normal                    Neuro/Psych:   Negative Neuro/Psych ROS              GI/Hepatic/Renal: Neg GI/Hepatic/Renal ROS            Endo/Other:              Pt had PAT visit. ROS comment: Sever glaucoma, uses drops  BMI 34 Abdominal:           Vascular: negative vascular ROS. Anesthesia Plan      MAC     ASA 3       Induction: intravenous. Anesthetic plan and risks discussed with patient. Use of blood products discussed with patient whom.                    SINAN Wright CRNA   4/22/2021

## 2022-06-17 ENCOUNTER — TELEPHONE (OUTPATIENT)
Dept: SURGERY | Facility: CLINIC | Age: 72
End: 2022-06-17

## 2022-06-17 NOTE — TELEPHONE ENCOUNTER
CALLED PT IN REGARDS TO 1 YEAR COLONOSCOPY RECALL LAST COLON 04/22/2021. PT IS READY TO S/C.   I HAVE VERIFIED PT DEMOGRAPHICS AND INSURANCE. PLEASE CALL PT TO S/C.

## 2022-06-24 ENCOUNTER — PREP FOR SURGERY (OUTPATIENT)
Dept: OTHER | Facility: HOSPITAL | Age: 72
End: 2022-06-24

## 2022-06-24 DIAGNOSIS — Z12.11 COLON CANCER SCREENING: Primary | ICD-10-CM

## 2022-06-24 RX ORDER — POLYETHYLENE GLYCOL 3350 17 G/17G
POWDER, FOR SOLUTION ORAL
Qty: 238 G | Refills: 0 | Status: SHIPPED | OUTPATIENT
Start: 2022-06-24

## 2022-06-24 RX ORDER — BISACODYL 5 MG
TABLET, DELAYED RELEASE (ENTERIC COATED) ORAL
Qty: 4 TABLET | Refills: 0 | Status: SHIPPED | OUTPATIENT
Start: 2022-06-24

## 2022-06-24 NOTE — TELEPHONE ENCOUNTER
Pt scheduled for colonoscopy at [facility] on 8/11/22.  Isntructions mailed, prep sent in.

## 2022-06-24 NOTE — TELEPHONE ENCOUNTER
PRESCREENING FOR OPEN ACCESS SCHEDULING    Garett Alvarado, 1950  [id number]    06/24/22    If, the patient answers yes to any of the following questions the provider will be informed prior to scheduling open access for approval and documented in the chart.    [x]  Yes  [] No    1. Have you ever had a colonoscopy in the past?      When:        Where:       Polyps or other:     []  Yes  [x] No    2. Family history of colon cancer?      Relation:       Age of onset:       Do you currently have any of the following?    []  Yes  [x] No  Rectal bleeding, if so, how long?     []  Yes  [x] No  Abdominal pain, if so, how long?    []  Yes  [x] No  Constipation, if so, how long?    []  Yes  [x] No  Diarrhea, if so, how long?    []  Yes  [x] No  Weight loss, is so, how much?    [] Yes  [x] No  Small caliber stool, if so, how long?      Have you ever had any of the following conditions?    [] Yes  [x] No  Heart attack?      When?       Last cardiac workup?     Blood thinners?    [] Yes  [x] No   Lung problems, asthma or COPD?  [] Yes  [x] No  Oxygen required?       [] Yes  [x] No  Stroke?     [] Yes  [x] No  Have you ever had a reaction to anesthesia?

## 2022-07-13 ENCOUNTER — TELEPHONE (OUTPATIENT)
Dept: SURGERY | Facility: CLINIC | Age: 72
End: 2022-07-13

## 2022-08-15 ENCOUNTER — TELEPHONE (OUTPATIENT)
Dept: SURGERY | Facility: CLINIC | Age: 72
End: 2022-08-15

## 2022-08-18 ENCOUNTER — OUTSIDE FACILITY SERVICE (OUTPATIENT)
Dept: SURGERY | Facility: CLINIC | Age: 72
End: 2022-08-18

## 2022-08-18 ENCOUNTER — ANESTHESIA (OUTPATIENT)
Dept: ENDOSCOPY | Facility: HOSPITAL | Age: 72
End: 2022-08-18
Payer: MEDICARE

## 2022-08-18 ENCOUNTER — ANESTHESIA EVENT (OUTPATIENT)
Dept: ENDOSCOPY | Facility: HOSPITAL | Age: 72
End: 2022-08-18
Payer: MEDICARE

## 2022-08-18 ENCOUNTER — HOSPITAL ENCOUNTER (OUTPATIENT)
Facility: HOSPITAL | Age: 72
Setting detail: OUTPATIENT SURGERY
Discharge: HOME | End: 2022-08-18
Attending: SURGERY | Admitting: SURGERY
Payer: MEDICARE

## 2022-08-18 VITALS
BODY MASS INDEX: 35.36 KG/M2 | HEIGHT: 70 IN | HEART RATE: 57 BPM | SYSTOLIC BLOOD PRESSURE: 129 MMHG | WEIGHT: 247 LBS | RESPIRATION RATE: 20 BRPM | OXYGEN SATURATION: 100 % | DIASTOLIC BLOOD PRESSURE: 74 MMHG | TEMPERATURE: 97.9 F

## 2022-08-18 PROCEDURE — 3700000000 HC ANESTHESIA ATTENDED CARE: Performed by: SURGERY

## 2022-08-18 PROCEDURE — 6360000002 HC RX W HCPCS: Performed by: NURSE ANESTHETIST, CERTIFIED REGISTERED

## 2022-08-18 PROCEDURE — 2709999900 HC NON-CHARGEABLE SUPPLY: Performed by: SURGERY

## 2022-08-18 PROCEDURE — 7100000011 HC PHASE II RECOVERY - ADDTL 15 MIN: Performed by: SURGERY

## 2022-08-18 PROCEDURE — 45384 COLONOSCOPY W/LESION REMOVAL: CPT | Performed by: SURGERY

## 2022-08-18 PROCEDURE — 7100000010 HC PHASE II RECOVERY - FIRST 15 MIN: Performed by: SURGERY

## 2022-08-18 PROCEDURE — 3700000001 HC ADD 15 MINUTES (ANESTHESIA): Performed by: SURGERY

## 2022-08-18 PROCEDURE — 3609010400 HC COLONOSCOPY POLYPECTOMY HOT BIOPSY: Performed by: SURGERY

## 2022-08-18 PROCEDURE — 2500000003 HC RX 250 WO HCPCS: Performed by: NURSE ANESTHETIST, CERTIFIED REGISTERED

## 2022-08-18 PROCEDURE — 2580000003 HC RX 258: Performed by: SURGERY

## 2022-08-18 RX ORDER — PROPOFOL 10 MG/ML
INJECTION, EMULSION INTRAVENOUS PRN
Status: DISCONTINUED | OUTPATIENT
Start: 2022-08-18 | End: 2022-08-18 | Stop reason: SDUPTHER

## 2022-08-18 RX ORDER — SODIUM CHLORIDE, SODIUM LACTATE, POTASSIUM CHLORIDE, CALCIUM CHLORIDE 600; 310; 30; 20 MG/100ML; MG/100ML; MG/100ML; MG/100ML
INJECTION, SOLUTION INTRAVENOUS CONTINUOUS
Status: DISCONTINUED | OUTPATIENT
Start: 2022-08-18 | End: 2022-08-18 | Stop reason: HOSPADM

## 2022-08-18 RX ADMIN — LIDOCAINE HYDROCHLORIDE 30 MG: 10 INJECTION, SOLUTION INFILTRATION; PERINEURAL at 09:43

## 2022-08-18 RX ADMIN — PROPOFOL 50 MG: 10 INJECTION, EMULSION INTRAVENOUS at 09:43

## 2022-08-18 RX ADMIN — PROPOFOL 40 MG: 10 INJECTION, EMULSION INTRAVENOUS at 09:51

## 2022-08-18 RX ADMIN — PROPOFOL 40 MG: 10 INJECTION, EMULSION INTRAVENOUS at 09:47

## 2022-08-18 RX ADMIN — SODIUM CHLORIDE, POTASSIUM CHLORIDE, SODIUM LACTATE AND CALCIUM CHLORIDE: 600; 310; 30; 20 INJECTION, SOLUTION INTRAVENOUS at 09:02

## 2022-08-18 NOTE — OP NOTE
PATIENT:    Rosalinda Kinacid    DATE OF SURGERY:  08/18/22    PHYSICIAN:    Devora Mares MD, MD, FACS    REFERRING PHYSICIAN:  Ailene Lesches, MD      YOB: 1950    PREOPERATIVE DIAGNOSIS:   History of rectal cancer and a polyp    POSTOPERATIVE DIAGNOSIS: Rectal polyp, suboptimal prep      Estimated blood loss: None    PROCEDURE:  Colonoscopy with polypectomy via hot forcep    HISTORY:   The patient was sent to me as a consultation via Ailene Lesches, MD for evaluation and treatment of the above-mentioned symptomatology. The patient is here now today for elective colonoscopy. I did meet with the patient preoperatively who understands the full risks and benefits of the above-mentioned procedure. We will proceed with this today on an elective basis. ANESTHESIA:  The patient was monitored both preoperatively and postoperatively in the normal fashion from a cardiovascular standpoint. Oxygen was delivered at 2 liters per nasal cannula, and oxygen saturations were monitored during the procedure. The patient remained stable from a cardiovascular standpoint throughout the entire procedure. OPERATIVE PROCEDURE:  The patient was taken to the endoscopy unit and placed in the supine position and given anesthesia as mentioned above. The patient was then placed in the left lateral decubitus position and the scope was introduced into the patients anus and then into the rectum without difficulty. The scope was carefully advanced throughout the colon to the ileocecal valve. All mucosal surfaces were visualized. Upon careful withdrawal of the scope, there was noted to be a 5-minute polyp in the rectum, removed with hot forcep and retrieved. Prep suboptimal.  No other findings in the colon. Previous polypectomy in the rectum shows no recurrence. .      A retroflexed view was obtained of the patients rectum and this showed internal hemorrhoids.   Digital rectal examination was performed and revealed good sphincter tone and no obvious masses. The patient was stable at this point in time and subsequently transferred back to the recovery room in stable condition.     QUALITY OF PREP: Suboptimal    PLAN: Repeat colonoscopy in 1 year    Electronically signed by Phill Muñoz MD, FACS on 8/18/2022 at 10:07 AM PATIENT:

## 2022-08-18 NOTE — ANESTHESIA POSTPROCEDURE EVALUATION
Department of Anesthesiology  Postprocedure Note    Patient: Kathryn Padron  MRN: [id number]  YOB: 1950  Date of evaluation: 8/18/2022      Procedure Summary     Date: 08/18/22 Room / Location: [address] [place] [address] 01 [address] [facility]    Anesthesia Start: 4614 Anesthesia Stop: 1001    Procedure: COLORECTAL CANCER SCREENING, NOT HIGH RISK Diagnosis:       Screen for colon cancer      (Screen for colon cancer [Z12.11])    Surgeons: Emeka Hooper MD Responsible Provider: SINAN Maravilla CRNA    Anesthesia Type: MAC ASA Status: 2          Anesthesia Type: MAC    Kaylah Phase I: Kaylah Score: 10    Kaylah Phase II: Kaylah Score: 10      Anesthesia Post Evaluation    Patient location during evaluation: bedside  Patient participation: complete - patient participated  Level of consciousness: awake and alert  Airway patency: patent  Nausea & Vomiting: no nausea and no vomiting  Complications: no  Cardiovascular status: blood pressure returned to baseline  Respiratory status: acceptable  Hydration status: stable

## 2022-08-18 NOTE — PROGRESS NOTES
Pt awake. No complaints of pain or nausea. Abd soft. +BS. +Flatus. Family at bedside. Side rails up x2. Tolerates CL well. Verbalizes understanding of d/c instructions.

## 2022-08-18 NOTE — PROGRESS NOTES
Pt arrives ambulatory. No complaints noted. Verifies he is here for colonoscopy with Dr. Cristin Pierre. States prep was effective. Verbalizes understanding of procedure. Consent on chart signed by daughter as patient is blind. Verbalizes understanding of D/c instructions. HR irregular. Pt states he has a heart murmer. Lungs clear. +BS. States daughter will drive him home post-procedure. Pt state he is here for routine screening. Side rails up x 2.

## 2022-08-18 NOTE — ANESTHESIA PRE PROCEDURE
Department of Anesthesiology  Preprocedure Note       Name:  Tian Jacobo   Age:  66 y.o.  :  1950                                          MRN:  [id number]         Date:  2022      Surgeon: Erlinda Ny):  Phill Muñoz MD    Procedure: Procedure(s):  COLORECTAL CANCER SCREENING, NOT HIGH RISK    Medications prior to admission:   Prior to Admission medications    Medication Sig Start Date End Date Taking? Authorizing Provider   GINSENG PO Take by mouth   Yes Historical Provider, MD   Sodium Chloride, Hypertonic, (CHRISTINE 128 OP) Apply to eye   Yes Historical Provider, MD       Current medications:    Current Facility-Administered Medications   Medication Dose Route Frequency Provider Last Rate Last Admin    lactated ringers infusion   IntraVENous Continuous Phill Muñoz MD 80 mL/hr at 22 New Bag at 22       Allergies: Allergies   Allergen Reactions    Iv Dye [Iodides]        Problem List:  There is no problem list on file for this patient.       Past Medical History:        Diagnosis Date    Glaucoma        Past Surgical History:        Procedure Laterality Date    COLONOSCOPY N/A 10/1/2020    COLONOSCOPY W/ ENDOSCOPIC MUCOSAL RESECTION performed by Phill Muñoz MD at [address] [place] [address] COLONOSCOPY N/A 2021    COLONOSCOPY POLYPECTOMY HOT BIOPSY performed by Phill Muñoz MD at [facility]       Social History:    Social History     Tobacco Use    Smoking status: Not on file    Smokeless tobacco: Not on file   Substance Use Topics    Alcohol use: Not on file                                Counseling given: Not Answered      Vital Signs (Current):   Vitals:    22 0852   BP: (!) 141/94   Pulse: 77   Resp: 20   Temp: 98.6 °F (37 °C)   TempSrc: Oral   SpO2: 97%   Weight: 247 lb (112 kg)   Height: 5' 9.5\" (1.765 m)                                              BP Readings from Last 3 Encounters:   22 (!) 141/94   21 (!) 148/94   21 (!) 156/107       NPO Status: Time of last liquid consumption: 2200                        Time of last solid consumption: 1830                        Date of last liquid consumption: 08/17/22                        Date of last solid food consumption: 08/16/22    BMI:   Wt Readings from Last 3 Encounters:   08/18/22 247 lb (112 kg)   04/22/21 235 lb (106.6 kg)   10/01/20 235 lb (106.6 kg)     Body mass index is 35.95 kg/m². CBC: No results found for: WBC, RBC, HGB, HCT, MCV, RDW, PLT    CMP: No results found for: NA, K, CL, CO2, BUN, CREATININE, GFRAA, AGRATIO, LABGLOM, GLUCOSE, GLU, PROT, CALCIUM, BILITOT, ALKPHOS, AST, ALT    POC Tests: No results for input(s): POCGLU, POCNA, POCK, POCCL, POCBUN, POCHEMO, POCHCT in the last 72 hours. Coags: No results found for: PROTIME, INR, APTT    HCG (If Applicable): No results found for: PREGTESTUR, PREGSERUM, HCG, HCGQUANT     ABGs: No results found for: PHART, PO2ART, UPP8UUD, MXK3CXH, BEART, S7FVHYWC     Type & Screen (If Applicable):  No results found for: LABABO, LABRH    Drug/Infectious Status (If Applicable):  No results found for: HIV, HEPCAB    COVID-19 Screening (If Applicable):   Lab Results   Component Value Date/Time    COVID19 Not Detected 04/19/2021 11:58 AM           Anesthesia Evaluation  Patient summary reviewed and Nursing notes reviewed  Airway: Mallampati: II          Dental:          Pulmonary:Negative Pulmonary ROS and normal exam  breath sounds clear to auscultation                             Cardiovascular:Negative CV ROS            Rhythm: regular  Rate: normal                    Neuro/Psych:   Negative Neuro/Psych ROS              GI/Hepatic/Renal: Neg GI/Hepatic/Renal ROS            Endo/Other: Negative Endo/Other ROS                     ROS comment: Hx glaucoma, blindness  Abdominal:             Vascular: negative vascular ROS. Other Findings:           Anesthesia Plan      MAC     ASA 2       Induction: intravenous.       Anesthetic plan and risks discussed with patient. Use of blood products discussed with patient whom.                      SINAN Ariza - NAOMI   8/18/2022

## 2023-08-15 NOTE — PROGRESS NOTES
Patient: Garett Alvarado    YOB: 1950    Date: 08/17/2023    Primary Care Provider: Jose Raul Field MD    Chief Complaint   Patient presents with    Colonoscopy       SUBJECTIVE:    History of present illness:  I saw the patient in the office today as a consultation for evaluation for a colonoscopy.  His colonoscopy last year had a sub-optimal prep.  He denies any diarrhea, constipation, or rectal bleeding.  Patient has mild epigastric pain and heartburn symptoms.  No rectal bleeding or anemia.    The following portions of the patient's history were reviewed and updated as appropriate: allergies, current medications, past family history, past medical history, past social history, past surgical history and problem list.    Review of Systems   Constitutional:  Negative for chills, fever and unexpected weight change.   HENT:  Negative for trouble swallowing and voice change.    Eyes:  Negative for visual disturbance.   Respiratory:  Negative for apnea, cough, chest tightness, shortness of breath and wheezing.    Cardiovascular:  Negative for chest pain, palpitations and leg swelling.   Gastrointestinal:  Negative for abdominal distention, abdominal pain, anal bleeding, blood in stool, constipation, diarrhea, nausea, rectal pain and vomiting.   Endocrine: Negative for cold intolerance and heat intolerance.   Genitourinary:  Negative for difficulty urinating, dysuria, flank pain, scrotal swelling and testicular pain.   Musculoskeletal:  Negative for back pain, gait problem and joint swelling.   Skin:  Negative for color change, rash and wound.   Neurological:  Negative for dizziness, syncope, speech difficulty, weakness, numbness and headaches.   Hematological:  Negative for adenopathy. Does not bruise/bleed easily.   Psychiatric/Behavioral:  Negative for confusion. The patient is not nervous/anxious.      History:  Past Medical History:   Diagnosis Date    Cancer     Rectal bleeding        Past Surgical  "History:   Procedure Laterality Date    EYE SURGERY      GLAUCOMA SURGERY         History reviewed. No pertinent family history.    Social History     Tobacco Use    Smoking status: Every Day     Packs/day: 2.00     Types: Cigars, Cigarettes    Smokeless tobacco: Never   Substance Use Topics    Alcohol use: Not Currently    Drug use: Never       Medications:   Current Outpatient Medications:     bisacodyl (bisacodyl) 5 MG EC tablet, Take 2 at 3pm and 2 at 7pm the day prior to colonoscopy., Disp: 4 tablet, Rfl: 0    bisacodyl (bisacodyl) 5 MG EC tablet, Take 2 at 3pm and 2 at 7pm the day prior to colonoscopy., Disp: 4 tablet, Rfl: 0    bisacodyl (Dulcolax) 5 MG EC tablet, Take 2 at 3pm and 2 at 7pm day prior to colonoscopy., Disp: 4 tablet, Rfl: 0    brimonidine (ALPHAGAN) 0.2 % ophthalmic solution, , Disp: , Rfl:     dorzolamide-timolol (COSOPT) 22.3-6.8 MG/ML ophthalmic solution, , Disp: , Rfl:     polyethylene glycol (GlycoLax) 17 GM/SCOOP powder, Mix 238 g with 64  Oz clear liquid. Starting at 5 PM drinking 8 oz every 10 to 15 min until consumed, Disp: 238 g, Rfl: 0    polyethylene glycol (MIRALAX) 17 GM/SCOOP powder, Mix 238g powder with 64 oz of clear liquid. Starting at 5pm drink 80z every 10-15 minutes until consumed., Disp: 238 g, Rfl: 0    polyethylene glycol (MIRALAX) 17 GM/SCOOP powder, Mix 238g powder with 64 oz of clear liquid. Starting at 5pm drink 80z every 10-15 minutes until consumed., Disp: 238 g, Rfl: 0       Allergies:   Allergies   Allergen Reactions    Iodine Unknown (See Comments)       OBJECTIVE:    Vital Signs:  Vitals:    08/17/23 0923   BP: 167/97   Pulse: 116   SpO2: 94%   Weight: 104 kg (229 lb)   Height: 175.3 cm (69\")       Physical Exam:   General Appearance:    Alert, cooperative, in no acute distress   Head:    Normocephalic, without obvious abnormality, atraumatic   Eyes:            Lids and lashes normal, conjunctivae and sclerae normal, no   icterus, no pallor, corneas clear, " PERRL   Ears:    Ears appear intact with no abnormalities noted   Throat:   No oral lesions, no thrush, oral mucosa moist   Neck:   No adenopathy, supple, trachea midline, no thyromegaly,  no JVD   Lungs:     Clear to auscultation,respirations regular, even and                  unlabored    Heart:    Regular rhythm and normal rate, normal S1 and S2, no            murmur   Abdomen:     no masses, no organomegaly, soft non-tender, non-distended, no guarding.  No abdominal wall hernias or masses.  Mildly tender epigastric region.   Extremities:   Moves all extremities well, no edema, no cyanosis, no             redness   Pulses:   Pulses palpable and equal bilaterally   Skin:   No bleeding, bruising or rash   Lymph nodes:   No palpable adenopathy   Neurologic:   Cranial nerves 2 - 12 grossly intact, sensation intact  Psychiatric: No evidence of depression or anxiety   Results Review:   I reviewed the patient's new clinical results.    Review of Systems was reviewed and confirmed as accurate as documented by the MA.    ASSESSMENT/PLAN:    1. Gastroesophageal reflux disease, unspecified whether esophagitis present    2. Colon -history of polyps        I recommend a colonoscopy for further evaluation. The procedure was explained as well as the risks which include but are not limited to bleeding, infection, perforation, abdominal pain etc. The patient understands these risks and the procedure and wishes to proceed.  Continue over-the-counter antacids for occasional heartburn.  Avoid caffeine alcohol and nicotine.     Electronically signed by Belinda Hoover MD  08/17/23  09:52 EDT

## 2023-08-17 ENCOUNTER — OFFICE VISIT (OUTPATIENT)
Dept: SURGERY | Facility: CLINIC | Age: 73
End: 2023-08-17
Payer: MEDICARE

## 2023-08-17 VITALS
DIASTOLIC BLOOD PRESSURE: 97 MMHG | HEIGHT: 69 IN | SYSTOLIC BLOOD PRESSURE: 167 MMHG | OXYGEN SATURATION: 94 % | HEART RATE: 116 BPM | WEIGHT: 229 LBS | BODY MASS INDEX: 33.92 KG/M2

## 2023-08-17 DIAGNOSIS — Z86.010 HISTORY OF COLON POLYPS: ICD-10-CM

## 2023-08-17 DIAGNOSIS — K21.9 GASTROESOPHAGEAL REFLUX DISEASE, UNSPECIFIED WHETHER ESOPHAGITIS PRESENT: Primary | ICD-10-CM

## 2023-08-17 PROCEDURE — 1160F RVW MEDS BY RX/DR IN RCRD: CPT | Performed by: SURGERY

## 2023-08-17 PROCEDURE — 99213 OFFICE O/P EST LOW 20 MIN: CPT | Performed by: SURGERY

## 2023-08-17 PROCEDURE — 1159F MED LIST DOCD IN RCRD: CPT | Performed by: SURGERY

## 2023-08-17 RX ORDER — BISACODYL 5 MG/1
TABLET, DELAYED RELEASE ORAL
Qty: 4 TABLET | Refills: 0 | Status: SHIPPED | OUTPATIENT
Start: 2023-08-17

## 2023-08-17 RX ORDER — POLYETHYLENE GLYCOL 3350 17 G/17G
POWDER, FOR SOLUTION ORAL
Qty: 238 G | Refills: 0 | Status: SHIPPED | OUTPATIENT
Start: 2023-08-17

## 2023-08-28 ENCOUNTER — TELEPHONE (OUTPATIENT)
Dept: SURGERY | Facility: CLINIC | Age: 73
End: 2023-08-28
Payer: MEDICARE

## 2023-08-31 ENCOUNTER — HOSPITAL ENCOUNTER (OUTPATIENT)
Facility: HOSPITAL | Age: 73
Setting detail: OUTPATIENT SURGERY
Discharge: HOME | End: 2023-08-31
Attending: SURGERY | Admitting: SURGERY
Payer: MEDICARE

## 2023-08-31 ENCOUNTER — ANESTHESIA EVENT (OUTPATIENT)
Dept: ENDOSCOPY | Facility: HOSPITAL | Age: 73
End: 2023-08-31
Payer: MEDICARE

## 2023-08-31 ENCOUNTER — OUTSIDE FACILITY SERVICE (OUTPATIENT)
Dept: SURGERY | Facility: CLINIC | Age: 73
End: 2023-08-31
Payer: MEDICARE

## 2023-08-31 ENCOUNTER — ANESTHESIA (OUTPATIENT)
Dept: ENDOSCOPY | Facility: HOSPITAL | Age: 73
End: 2023-08-31
Payer: MEDICARE

## 2023-08-31 VITALS
OXYGEN SATURATION: 100 % | WEIGHT: 225 LBS | HEIGHT: 70 IN | BODY MASS INDEX: 32.21 KG/M2 | RESPIRATION RATE: 16 BRPM | DIASTOLIC BLOOD PRESSURE: 81 MMHG | HEART RATE: 59 BPM | TEMPERATURE: 98.5 F | SYSTOLIC BLOOD PRESSURE: 120 MMHG

## 2023-08-31 PROCEDURE — 3609010400 HC COLONOSCOPY POLYPECTOMY HOT BIOPSY: Performed by: SURGERY

## 2023-08-31 PROCEDURE — 3700000001 HC ADD 15 MINUTES (ANESTHESIA): Performed by: SURGERY

## 2023-08-31 PROCEDURE — 2500000003 HC RX 250 WO HCPCS: Performed by: NURSE ANESTHETIST, CERTIFIED REGISTERED

## 2023-08-31 PROCEDURE — 2580000003 HC RX 258: Performed by: SURGERY

## 2023-08-31 PROCEDURE — 3700000000 HC ANESTHESIA ATTENDED CARE: Performed by: SURGERY

## 2023-08-31 PROCEDURE — 7100000011 HC PHASE II RECOVERY - ADDTL 15 MIN: Performed by: SURGERY

## 2023-08-31 PROCEDURE — 7100000010 HC PHASE II RECOVERY - FIRST 15 MIN: Performed by: SURGERY

## 2023-08-31 PROCEDURE — 6360000002 HC RX W HCPCS: Performed by: NURSE ANESTHETIST, CERTIFIED REGISTERED

## 2023-08-31 PROCEDURE — 2709999900 HC NON-CHARGEABLE SUPPLY: Performed by: SURGERY

## 2023-08-31 RX ORDER — SODIUM CHLORIDE, SODIUM LACTATE, POTASSIUM CHLORIDE, CALCIUM CHLORIDE 600; 310; 30; 20 MG/100ML; MG/100ML; MG/100ML; MG/100ML
INJECTION, SOLUTION INTRAVENOUS CONTINUOUS
Status: DISCONTINUED | OUTPATIENT
Start: 2023-08-31 | End: 2023-08-31 | Stop reason: HOSPADM

## 2023-08-31 RX ORDER — PROPOFOL 10 MG/ML
INJECTION, EMULSION INTRAVENOUS PRN
Status: DISCONTINUED | OUTPATIENT
Start: 2023-08-31 | End: 2023-08-31 | Stop reason: SDUPTHER

## 2023-08-31 RX ADMIN — PROPOFOL 40 MG: 10 INJECTION, EMULSION INTRAVENOUS at 10:33

## 2023-08-31 RX ADMIN — PROPOFOL 50 MG: 10 INJECTION, EMULSION INTRAVENOUS at 10:37

## 2023-08-31 RX ADMIN — PROPOFOL 50 MG: 10 INJECTION, EMULSION INTRAVENOUS at 10:29

## 2023-08-31 RX ADMIN — SODIUM CHLORIDE, POTASSIUM CHLORIDE, SODIUM LACTATE AND CALCIUM CHLORIDE: 600; 310; 30; 20 INJECTION, SOLUTION INTRAVENOUS at 10:19

## 2023-08-31 RX ADMIN — PROPOFOL 30 MG: 10 INJECTION, EMULSION INTRAVENOUS at 10:40

## 2023-08-31 RX ADMIN — LIDOCAINE HYDROCHLORIDE 30 MG: 10 INJECTION, SOLUTION INFILTRATION; PERINEURAL at 10:29

## 2023-08-31 NOTE — PROGRESS NOTES
Pt tolerating PO well without N/V. IV DC'ed with tip intact and pressure applied. No complications at site. DC instructions given prior to procedure and after procedure with follow up appointment given. Pt verb understanding and denies questions. Dgt to bedside, verb and signed dc instructions with understanding. Pt dgt signed for pt due to pt is blind. Condition stable. Belongings with pt. Pt left endoscopy with dgt. Pt ambulatory. DC instructions in hand.

## 2023-08-31 NOTE — ANESTHESIA POSTPROCEDURE EVALUATION
Department of Anesthesiology  Postprocedure Note    Patient: Whit Pop  MRN: [id number]  YOB: 1950  Date of evaluation: 8/31/2023      Procedure Summary     Date: 08/31/23 Room / Location: [address] [place] [address] 01 / [address]    Anesthesia Start: 1026 Anesthesia Stop: 5428    Procedure: COLORECTAL CANCER SCREENING, NOT HIGH RISK Diagnosis:       Gastroesophageal reflux disease, unspecified whether esophagitis present      (Gastroesophageal reflux disease, unspecified whether esophagitis present [K21.9])    Surgeons: Xavier Vasquez MD Responsible Provider: SINAN Alcala CRNA    Anesthesia Type: MAC ASA Status: 3          Anesthesia Type: MAC    Kaylah Phase I: Kaylah Score: 10    Kaylah Phase II: Kaylah Score: 10      Anesthesia Post Evaluation    Patient location during evaluation: bedside  Patient participation: complete - patient participated  Level of consciousness: awake and alert  Airway patency: patent  Nausea & Vomiting: no nausea and no vomiting  Complications: no  Cardiovascular status: blood pressure returned to baseline  Respiratory status: acceptable  Hydration status: stable  Pain management: adequate

## 2023-08-31 NOTE — PROGRESS NOTES
Pt to room via stretcher, NAD, No c/o noted. HOB elevated 30 degrees, Left side lying position. BS active in all 4 quads. Lung sounds CTA and no distress noted. Rails up x2, stretcher locked. Assessment completed. Awakens easily. Abd soft /NT/ND:+flatus, +BS, Denies N/V/abd pain. Report received from [address]

## 2023-08-31 NOTE — PROGRESS NOTES
Pt arrives ambulatory. No complaints noted. Verifies he is here for colonoscopy with Dr. Kashif Dowd. States prep was effective. Verbalizes understanding of procedure. Consent on chart. Verbalizes understanding of d/c instructions. HR regular. Lungs clear. +BS. Denies chest pain or SOA. States he is here for a routine screening. States daughter will drive him home post-procedure. Side rails up x 2.

## 2023-08-31 NOTE — H&P
HISTORY & PHYSICAL      Patient Name: Rosalinda Cortez Record Number: [id number]       Date of Service: 8/31//2023      I have reviewed the consult or history and physical from 8/24/2020. There have been no significant changes in the patient's history or exam.  There have been no changes in the patient's medications.     Past Surgical History:   Procedure Laterality Date    COLONOSCOPY N/A 10/1/2020    COLONOSCOPY W/ ENDOSCOPIC MUCOSAL RESECTION performed by Pilar Jerome MD at [facility] ENDOSCOPY    COLONOSCOPY N/A 4/22/2021    COLONOSCOPY POLYPECTOMY HOT BIOPSY performed by Pilar Jerome MD at [facility] ENDOSCOPY    COLONOSCOPY N/A 8/18/2022    COLONOSCOPY POLYPECTOMY HOT BIOPSY performed by Pilar Jerome MD at [facility] ENDOSCOPY        Past Medical History:   Diagnosis Date    Glaucoma         Current Facility-Administered Medications   Medication Dose Route Frequency Provider Last Rate Last Admin    lactated ringers IV soln infusion   IntraVENous Continuous Pilar Jerome  mL/hr at 08/31/23 1043 Rate Change at 08/31/23 1043        Electronically signed by Pilar Jerome MD on 8/31/2023 at 10:48 AM

## (undated) DEVICE — DISPOSABLE HOT BIOPSY FORCEPS: Brand: DISPOSABLE HOT BIOPSY FORCEPS

## (undated) DEVICE — NAKAO SPIDER-NET RETRIEVAL DEVICE 230 X 2.5 X 5.5 CM: Brand: NAKAO

## (undated) DEVICE — CATHETER SCLERO L240CM NDL 25GA L4MM SHTH DIA2.3MM CNTRST

## (undated) DEVICE — FORCEP BX 2.2 MMX240 CM CHANNEL SERRATED RADIAL JAW 4

## (undated) DEVICE — ACUSNARE POLYPECTOMY SNARE SOFT: Brand: ACUSNARE